# Patient Record
Sex: MALE | Race: WHITE | NOT HISPANIC OR LATINO | URBAN - METROPOLITAN AREA
[De-identification: names, ages, dates, MRNs, and addresses within clinical notes are randomized per-mention and may not be internally consistent; named-entity substitution may affect disease eponyms.]

---

## 2021-08-04 ENCOUNTER — EMERGENCY (EMERGENCY)
Facility: HOSPITAL | Age: 56
LOS: 1 days | Discharge: HOME | End: 2021-08-04
Admitting: EMERGENCY MEDICINE
Payer: MEDICAID

## 2021-08-04 VITALS
HEIGHT: 72 IN | RESPIRATION RATE: 16 BRPM | OXYGEN SATURATION: 97 % | HEART RATE: 67 BPM | TEMPERATURE: 98 F | WEIGHT: 190.04 LBS | SYSTOLIC BLOOD PRESSURE: 130 MMHG | DIASTOLIC BLOOD PRESSURE: 63 MMHG

## 2021-08-04 VITALS
HEART RATE: 59 BPM | TEMPERATURE: 98 F | DIASTOLIC BLOOD PRESSURE: 78 MMHG | OXYGEN SATURATION: 98 % | SYSTOLIC BLOOD PRESSURE: 118 MMHG | RESPIRATION RATE: 18 BRPM

## 2021-08-04 DIAGNOSIS — M25.462 EFFUSION, LEFT KNEE: ICD-10-CM

## 2021-08-04 DIAGNOSIS — M25.562 PAIN IN LEFT KNEE: ICD-10-CM

## 2021-08-04 DIAGNOSIS — Z87.39 PERSONAL HISTORY OF OTHER DISEASES OF THE MUSCULOSKELETAL SYSTEM AND CONNECTIVE TISSUE: ICD-10-CM

## 2021-08-04 PROBLEM — Z00.00 ENCOUNTER FOR PREVENTIVE HEALTH EXAMINATION: Status: ACTIVE | Noted: 2021-08-04

## 2021-08-04 PROCEDURE — 99283 EMERGENCY DEPT VISIT LOW MDM: CPT

## 2021-08-04 PROCEDURE — 73562 X-RAY EXAM OF KNEE 3: CPT | Mod: 26,LT

## 2021-08-04 RX ORDER — IBUPROFEN 200 MG
1 TABLET ORAL
Qty: 28 | Refills: 0
Start: 2021-08-04

## 2021-08-04 RX ORDER — IBUPROFEN 200 MG
600 TABLET ORAL ONCE
Refills: 0 | Status: COMPLETED | OUTPATIENT
Start: 2021-08-04 | End: 2021-08-04

## 2021-08-04 RX ADMIN — Medication 600 MILLIGRAM(S): at 09:51

## 2021-08-04 NOTE — ED PROVIDER NOTE - CLINICAL SUMMARY MEDICAL DECISION MAKING FREE TEXT BOX
left knee effusion, well appearing, NAD, no signs of septic joint, ambulatory, RICE, ace wrap, nsaids prn, will provide cane as patient lost his, SW arranged ortho appointment for followup.

## 2021-08-04 NOTE — ED PROVIDER NOTE - PHYSICAL EXAMINATION
VITAL SIGNS: I have reviewed nursing notes and confirm.  CONSTITUTIONAL: Well-developed; well-nourished; in no acute distress.  SKIN: Skin is warm and dry, no acute rash.  HEAD: Normocephalic; atraumatic.  EYES: PERRL, EOM intact; conjunctiva and sclera clear.  ENT: No nasal discharge; airway clear.  NECK: Supple; non tender.  CARD: S1, S2 normal; no murmurs, gallops, or rubs. Regular rate and rhythm.  RESP: No wheezes, rales or rhonchi.  ABD: Normal bowel sounds; soft; non-distended; non-tender.  MSK: [+] L anterior knee swelling with tenderness to palpation over the suprapatellar bursa. FROM of the L knee actively. No darrell tenderness. DPI. Soft compartments. Good strength. Normal color and temperature. Good cap refill. Ambulatory.    NEURO: Alert, oriented. Grossly unremarkable.  PSYCH: Cooperative, appropriate. VITAL SIGNS: I have reviewed nursing notes and confirm.  CONSTITUTIONAL: Well-developed; well-nourished; in no acute distress.  SKIN: Skin is warm and dry, no acute rash.  MSK: [+] L anterior knee swelling with tenderness to palpation over the suprapatellar bursa. FROM of the left knee actively. No bony tenderness. DPI. Soft compartments. Good strength. Normal color and temperature. Good cap refill. Ambulatory.    NEURO: Alert, oriented. Grossly unremarkable.  PSYCH: Cooperative, appropriate.

## 2021-08-04 NOTE — ED PROVIDER NOTE - CARE PROVIDER_API CALL
Selvin Mendoza (MD)  [facility]  Orthopedics  [address] [place] [address]  Phone: [phone]  Fax: [phone]  Follow Up Time:

## 2021-08-04 NOTE — ED PROVIDER NOTE - OBJECTIVE STATEMENT
55 y/o M with PMHx of bilateral knee arthritis and lupus presents to the ED for L knee pain and swelling. Pt reports he recently had an incision & drainage procedure done on the R knee in the setting of swelling to the area. Yesterday night, he noted the L knee to be swollen. This morning, he woke up with increased discomfort to the L knee. Pt endorses difficulty ambulating secondary to L knee pain and swelling. He denies fevers, chills, or recent IV drug use. Of note, Pt states he typically ambulates with a cane but he lost his cane. He did not take any pain medications prior to arrival. 55 y/o M with PMHx of bilateral knee arthritis and lupus presents to the ED for left knee pain and swelling worsened over the past few days. Pt reports he recently had right knee effusion drainage at [facility] a few weeks ago. This morning, he woke up with increased discomfort to the left knee. Pt endorses difficulty ambulating secondary to L knee pain and swelling. denies trauma or fall. He denies fevers, chills, or recent IV drug use. Of note, Pt states he typically ambulates with a cane but he lost his cane. He did not take any pain medications prior to arrival.

## 2021-08-04 NOTE — ED PROVIDER NOTE - CARE PROVIDERS DIRECT ADDRESSES
EKG was done.  Piero Huynh MA     [email or web address]

## 2021-08-04 NOTE — ED PROVIDER NOTE - NSFOLLOWUPINSTRUCTIONS_ED_ALL_ED_FT
Leg elevation  Ace wrap  Follow up with Orthopedics on August 10th, an appointment was made for you.     Return for any concerning or worsening symptoms such as fever, severe pain, unable to walk or any concerns.

## 2021-08-04 NOTE — ED ADULT NURSE NOTE - OBJECTIVE STATEMENT
Ptaox3 with steady gait on arrival. atraumatic left knee pain with swelling x days. Pt states "it has to be drained", last time he had a knee drained was 6months ago at  [facility] and it was his right knee. no redness and pt denies fevers

## 2021-08-04 NOTE — ED PROVIDER NOTE - PATIENT PORTAL LINK FT
You can access the FollowMyHealth Patient Portal offered by [facility] by registering at the following website: [email or web address]. By joining [facility]’s FollowMyHealth portal, you will also be able to view your health information using other applications (apps) compatible with our system.

## 2021-08-17 ENCOUNTER — APPOINTMENT (OUTPATIENT)
Dept: ORTHOPEDIC SURGERY | Facility: CLINIC | Age: 56
End: 2021-08-17
